# Patient Record
Sex: FEMALE | Race: WHITE | NOT HISPANIC OR LATINO | Employment: UNEMPLOYED | URBAN - METROPOLITAN AREA
[De-identification: names, ages, dates, MRNs, and addresses within clinical notes are randomized per-mention and may not be internally consistent; named-entity substitution may affect disease eponyms.]

---

## 2021-04-27 ENCOUNTER — LAB (OUTPATIENT)
Dept: LAB | Facility: HOSPITAL | Age: 41
End: 2021-04-27

## 2021-04-27 ENCOUNTER — OFFICE VISIT (OUTPATIENT)
Dept: NEUROLOGY | Facility: CLINIC | Age: 41
End: 2021-04-27

## 2021-04-27 VITALS
TEMPERATURE: 97.5 F | HEART RATE: 97 BPM | SYSTOLIC BLOOD PRESSURE: 116 MMHG | HEIGHT: 65 IN | BODY MASS INDEX: 34.16 KG/M2 | DIASTOLIC BLOOD PRESSURE: 72 MMHG | WEIGHT: 205 LBS | OXYGEN SATURATION: 99 %

## 2021-04-27 DIAGNOSIS — M62.81 MUSCLE WEAKNESS OF LOWER EXTREMITY: ICD-10-CM

## 2021-04-27 DIAGNOSIS — M62.81 MUSCLE WEAKNESS OF LOWER EXTREMITY: Primary | ICD-10-CM

## 2021-04-27 LAB
CK SERPL-CCNC: 43 U/L (ref 20–180)
LDH SERPL-CCNC: 181 U/L (ref 135–214)

## 2021-04-27 PROCEDURE — 99204 OFFICE O/P NEW MOD 45 MIN: CPT | Performed by: PSYCHIATRY & NEUROLOGY

## 2021-04-27 PROCEDURE — 82550 ASSAY OF CK (CPK): CPT

## 2021-04-27 PROCEDURE — 82085 ASSAY OF ALDOLASE: CPT

## 2021-04-27 PROCEDURE — 83615 LACTATE (LD) (LDH) ENZYME: CPT

## 2021-04-27 PROCEDURE — 36415 COLL VENOUS BLD VENIPUNCTURE: CPT

## 2021-04-27 RX ORDER — TIZANIDINE 4 MG/1
TABLET ORAL
COMMUNITY
Start: 2021-03-10

## 2021-04-27 RX ORDER — FERROUS SULFATE 325(65) MG
TABLET ORAL
COMMUNITY
Start: 2021-03-10

## 2021-04-27 RX ORDER — ASPIRIN 81 MG/1
TABLET, FILM COATED ORAL
COMMUNITY
Start: 2021-02-17

## 2021-04-27 RX ORDER — ERTUGLIFLOZIN 15 MG/1
TABLET, FILM COATED ORAL
COMMUNITY
Start: 2021-03-11 | End: 2021-06-22

## 2021-04-27 RX ORDER — PANTOPRAZOLE SODIUM 40 MG/1
TABLET, DELAYED RELEASE ORAL
COMMUNITY
Start: 2021-04-16

## 2021-04-27 RX ORDER — ATORVASTATIN CALCIUM 20 MG/1
TABLET, FILM COATED ORAL
COMMUNITY
Start: 2021-02-09

## 2021-04-27 RX ORDER — BLOOD SUGAR DIAGNOSTIC
STRIP MISCELLANEOUS
COMMUNITY
Start: 2021-04-16

## 2021-04-27 RX ORDER — SYRINGE AND NEEDLE,INSULIN,1ML 31 GX5/16"
SYRINGE, EMPTY DISPOSABLE MISCELLANEOUS
COMMUNITY
Start: 2021-02-09

## 2021-04-27 RX ORDER — LORATADINE 10 MG/1
TABLET ORAL
COMMUNITY
Start: 2021-03-09

## 2021-04-27 RX ORDER — LISINOPRIL 10 MG/1
TABLET ORAL
COMMUNITY
Start: 2021-02-22

## 2021-04-27 RX ORDER — LANCETS 30 GAUGE
EACH MISCELLANEOUS
COMMUNITY
Start: 2021-04-16

## 2021-04-27 NOTE — PROGRESS NOTES
Subjective:    CC: Afia Soria is seen today in consultation at the request of Reva López for Muscle weakness       HPI:  Patient is a 41-year-old female with past medical history of diabetes, hyperlipidemia, hypertension referred to the clinic for evaluation of lower extremity weakness.  She reports that symptoms started back in November 2019 quite abruptly and by December 2019, she had to start using walker.  She reports that since then, she has not noticed much improvement in her muscle strength except for slight improvement in ankle flexion and dorsiflexion with continued physical therapy.  She reports that is difficult for her to climb up stairs and also difficult for her to get up from chair.  She denies any weakness in her arms.  She was diagnosed with diabetes back in 2012.  Last A1c was 8.  She denies any double vision, no problems with chewing, swallowing or breathing.  She denies tingling, numbness, burning in her feet or hands.  She has had MRI of lumbosacral spine as well as thoracic spine which showed only minimal to mild spondylitic changes.  She is unsure if EMG/nerve conduction study has been completed or not.  She denies any pain in proximal muscles in lower extremities, there is no evidence of muscle atrophy.    The following portions of the patient's history were reviewed today and updated as of 04/27/2021  : allergies, social history and problem list.  This document will be scanned to patient's chart.      Current Outpatient Medications:   •  Admelog 100 UNIT/ML injection, , Disp: , Rfl:   •  Adult Aspirin Regimen 81 MG EC tablet, , Disp: , Rfl:   •  atorvastatin (LIPITOR) 20 MG tablet, , Disp: , Rfl:   •  FeroSul 325 (65 Fe) MG tablet, , Disp: , Rfl:   •  Lancets (OneTouch Delica Plus [id number]) misc, , Disp: , Rfl:   •  lisinopril (PRINIVIL,ZESTRIL) 10 MG tablet, , Disp: , Rfl:   •  loratadine (CLARITIN) 10 MG tablet, , Disp: , Rfl:   •  metFORMIN (GLUCOPHAGE) 1000 MG tablet, ,  "Disp: , Rfl:   •  metoprolol tartrate (LOPRESSOR) 25 MG tablet, , Disp: , Rfl:   •  NovoLOG 100 UNIT/ML injection, , Disp: , Rfl:   •  OneTouch Ultra test strip, , Disp: , Rfl:   •  pantoprazole (PROTONIX) 40 MG EC tablet, , Disp: , Rfl:   •  Steglatro 15 MG tablet, , Disp: , Rfl:   •  tiZANidine (ZANAFLEX) 4 MG tablet, , Disp: , Rfl:   •  TRUEplus Insulin Syringe 31G X 5/16\" 1 ML misc, , Disp: , Rfl:    Past Medical History:   Diagnosis Date   • Anemia    • Diabetes mellitus (CMS/HCC)    • Disc disorder    • GERD (gastroesophageal reflux disease)    • Hyperlipidemia    • Hypertension       Past Surgical History:   Procedure Laterality Date   • GALLBLADDER SURGERY        Family History   Problem Relation Age of Onset   • Stroke Mother    • Dementia Father    • Dementia Paternal Grandmother       Review of Systems   HENT: Negative.    Eyes: Negative.    Respiratory: Negative.    Cardiovascular: Negative.    Gastrointestinal: Negative.    Endocrine: Negative.    Genitourinary: Negative.    Musculoskeletal: Positive for gait problem.   Skin: Negative.    Allergic/Immunologic: Negative.    Neurological: Positive for weakness and numbness.   Hematological: Negative.    Psychiatric/Behavioral: Negative.        All other systems reviewed and are negative     Objective:    /72   Pulse 97   Temp 97.5 °F (36.4 °C)   Ht 165.1 cm (65\")   Wt 93 kg (205 lb)   SpO2 99%   BMI 34.11 kg/m²     Neurology Exam:    General apperance: NAD.     Mental status: Alert, awake and oriented to time place and person.    Recent and Remote memory: Can recall 3/3 objects at 5 minutes. Can recall historical events.     Attention span and Concentration: Serial 7s: Normal.     Fund of knowledge:  Normal.     Language and Speech: No aphasia or dysarthria.    Naming , Repitition and Comprehension:  Can name objects, repeat a sentence and follow commands. Speech is clear and fluent with good repetition, comprehension, and naming.    Cranial " Nerves:   CN II: Visual fields are full. Intact. Fundi - Normal, No papillederma, Pupils - CR  CN III, IV and VI: Extraocular movements are intact. Normal saccades.   CN V: Facial sensation is intact.   CN VII: Muscles of facial expression reveal no asymmetry. Intact.   CN VIII: Hearing is intact. Whispered voice intact.   CN IX and X: Palate elevates symmetrically. Intact  CN XI: Shoulder shrug is intact.   CN XII: Tongue is midline without evidence of atrophy or fasciculation.     Motor:  Right UE muscle strength 5/5. Normal tone.     Left UE muscle strength 5/5. Normal tone.      Right LE muscle strength 4/5. Normal tone.  Giveaway weakness noted.    Left LE muscle strength 4/5. Normal tone.  Giveaway weakness noted.    Sensory: Normal light touch, vibration and pinprick sensation bilaterally.    DTRs: 2+ bilaterally in upper and lower extremities.    Babinski: Negative bilaterally.    Co-ordination: Normal finger-to-nose, heel to shin B/L.    Rhomberg: Negative.    Gait: Normal.    Cardiovascular: Regular rate and rhythm without murmur, gallop or rub.    Ophthalmoscopic exam: Normal fundi, no papilledema.    Assessment and Plan:  1. Muscle weakness of lower extremity  Patient with abrupt onset of bilateral proximal muscle weakness in lower extremities back in November 2019.  Since December 2019, she is using a walker to walk.  MRI of her lumbar spine and thoracic spine performed in [place], [place] did not reveal any abnormality that could explain her symptoms.  On my examination, she was noted to have significant giveaway weakness in both lower extremity proximal muscles.  I did not notice any evidence of muscle atrophy and she denies any pain in proximal muscles that could explain possibility of diabetic amyotrophy causing the symptoms.  She has been on statin for a while so I will be checking muscle enzymes and will be scheduling her for EMG/nerve conduction study for further evaluation.  If normal then  with aggressive physical therapy, this should get better.  I will plan to see her back in 8 weeks for follow-up.  - CK; Future  - Aldolase; Future  - Lactate Dehydrogenase; Future  - EMG & Nerve Conduction Test; Future       No follow-ups on file.     Frank Vargas MD

## 2021-04-28 LAB — ALDOLASE SERPL-CCNC: 3.7 U/L (ref 3.3–10.3)

## 2021-05-11 ENCOUNTER — TELEPHONE (OUTPATIENT)
Dept: NEUROLOGY | Facility: CLINIC | Age: 41
End: 2021-05-11

## 2021-05-11 NOTE — TELEPHONE ENCOUNTER
Provider: DR MANJARREZ    Caller: YOVANNY URIBE    Relationship to Patient: SELF    Reason for Call: ANY CALLING IN STATING THAT [facility] FAXED OVER A FORM FOR DR MANJARREZ TO SIGN.   SHE STATES THAT THEY NEVER RECEIVED THE FAX BACK.   PLEASE FAX THE FORM TO [facility].  ANY QUESTIONS PLEASE CALL PATIENT.

## 2021-05-13 NOTE — TELEPHONE ENCOUNTER
PT STATES THIS WAS FAXED AGAIN. PT WOULD LIKE CONFIRMATION. PT STATES THAT THEY HAVEN'T RECEIVED THE PAPER WORK BACK. PT NEEDS THIS BY TOMORROW TO BE ABLE TO BE TRANSPORTED TO HER APPT WITH DR. ALEX     CALL BACK -[phone]

## 2021-05-18 ENCOUNTER — PROCEDURE VISIT (OUTPATIENT)
Dept: NEUROLOGY | Facility: CLINIC | Age: 41
End: 2021-05-18

## 2021-05-18 DIAGNOSIS — M62.81 MUSCLE WEAKNESS OF LOWER EXTREMITY: ICD-10-CM

## 2021-05-18 PROCEDURE — 95886 MUSC TEST DONE W/N TEST COMP: CPT | Performed by: PSYCHIATRY & NEUROLOGY

## 2021-05-18 PROCEDURE — 95911 NRV CNDJ TEST 9-10 STUDIES: CPT | Performed by: PSYCHIATRY & NEUROLOGY

## 2021-05-18 NOTE — PROGRESS NOTES
[facility], [place], operated by [facility]   Electrodiagnostic Laboratory    Nerve Conduction & EMG Report        Patient:  Afia Soria   Patient ID: [id number]   YOB: 1980  Sex:  female      Exam Physician:  Geraldo Shah MD  Refer Physician:  Frank Vargas MD    Electromyogram and Nerve Conduction Velocity Procedure Note    Hx: 41 y.o. right handed female with complaint of weakness involving the both lower extremities. Symptoms have been present for several months and were provoked by no clear event. Significant past medical history includes diabetes mellitus.  Family history no family history of nerve or muscle disease.    Exam: Motor power is 4/5 B LE. There is no atrophy. There are no fasciculations. Deep tendon reflexes are present and symmetrical at 2+. Sensory exam is normal.      Edx studies of the B LE were performed to evaluate for peripheral neuropathy.     NCS Examination   For sensory nerve conduction studies, the amplitude is measured peak-to-peak, the latency reported is the distal peak latency, and the conduction velocity, if measured, is determined from onset latencies and is over the forearm.   For motor nerve conduction studies, the amplitude is measured baseline-to-peak, the latency reported is the distal onset latency, the conduction velocity is calculated over the forearm, and the F wave latency is the minimum latency.   Unless otherwise noted, the hand temperature was monitored continuously and remained between 32°C and 36°C during the performance of the NCSs.        Sensory NCS      Nerve / Sites Rec. Site Onset Lat Peak Lat NP Amp PP Amp Segments Distance Velocity     ms ms µV µV  cm m/s   L Sural - Ankle (Calf)      Calf Ankle 1.88 2.60 4.0 16.0 Calf - Ankle 14 75   R Sural - Ankle (Calf)      Calf Ankle 2.14 2.86 7.6 4.4 Calf - Ankle 14 66   L Superficial peroneal - Ankle      Lat leg Ankle 3.07 4.06 11.1  Lat leg - Ankle 14 46   R Superficial peroneal - Ankle      Lat leg Ankle  2.40 3.28 7.9 17.3 Lat leg - Ankle 14 58               Motor NCS      Nerve / Sites Muscle Latency Amplitude Amp % Duration Segments Distance Lat Diff Velocity     ms mV % ms  cm ms m/s   L Peroneal - EDB      Ankle EDB 3.75 6.6 100 6.09 Ankle - EDB 8        Fib head EDB 11.25 4.8 72.5 7.45 Fib head - Ankle 33 7.50 44      Pop fossa EDB 12.81 5.9 90.4 6.51 Pop fossa - Fib head 9 1.56 58   R Peroneal - EDB      Ankle EDB 3.80 5.0 100 5.47 Ankle - EDB 8        Fib head EDB 9.79 4.9 97.8 8.75 Fib head - Ankle 30 5.99 50      Pop fossa EDB 12.03 0.2 4.08 8.80 Pop fossa - Fib head 10 2.24 45   L Tibial - AH      Ankle AH 4.22 9.6 100 6.56 Ankle - AH 8        Pop fossa AH 14.38 3.2 32.9 7.29 Pop fossa - Ankle 40 10.16 39   R Tibial - AH      Ankle AH 5.57 2.0 100 4.53 Ankle - AH 8        Pop fossa AH 10.99 0.5 26.3 37.50 Pop fossa - Ankle 40 5.42 74               F  Wave      Nerve F Lat M Lat F-M Lat    ms ms ms   L Peroneal - EDB 54.6 3.2 51.4   L Tibial - AH 55.1 4.4 50.6   R Tibial - AH 54.8 4.9 49.9   R Peroneal - EDB 51.6 3.1 48.5               H Reflex      Nerve H Lat    ms   L Tibial - Soleus 30.8   R Tibial - Soleus 31.7               EMG Examination   The study was performed with a concentric needle electrode. Fibrillation and fasciculation activity is graded from none (0) to continuous (4+). The configuration and recruitment pattern of motor unit action potentials under voluntary control, if not normal, are described below    EMG Summary Table     Spontaneous MUAP Recruitment   Muscle Nerve Roots IA Fib PSW Fasc H.F. Amp Dur. PPP Pattern   L. Lumbar paraspinals Spinal L1-L5 N None None None None N N N N   L. Gastrocnemius (Medial head) Tibial S1-S2 N None None None None N N N N   R. Gastrocnemius (Medial head) Tibial S1-S2 N None None None None N N N N   R. Lumbar paraspinals Spinal L1-L5 N None None None None N N N N   L. Peroneus longus Perineal L5-S1 N None None None None N N N N   R. Peroneus longus  Perineal L5-S1 N None None None None N N N N   L. Tibialis anterior Deep peroneal (Fibular) L4-L5 N None None None None N N N N   R. Tibialis anterior Deep peroneal (Fibular) L4-L5 N None None None None N N N N   L. Tibialis posterior Tibial L4-L5 N None None None None N N N N   R. Tibialis posterior Tibial L4-L5 N None None None None N N N N   L. Vastus lateralis Femoral L2-L4 N None None None None N N N N   R. Vastus lateralis Femoral L2-L4 N None None None None N N N N       Summary    The motor conduction test was performed on 4 nerve(s). The results were normal in 3 nerve(s): L Peroneal - EDB, R Peroneal - EDB, L Tibial - AH. Results outside the specified normal range were found in 1 nerve(s), as follows:  • In the R Tibial - AH study  o the peak amplitude result was reduced for Ankle stimulation    The sensory conduction test was performed on 4 nerve(s). The results were normal in 3 nerve(s): R Sural - Ankle (Calf), L Superficial peroneal - Ankle, R Superficial peroneal - Ankle. Results outside the specified normal range were found in 1 nerve(s), as follows:  • In the L Sural - Ankle (Calf) study  o the peak amplitude result was reduced for Calf stimulation    The F wave study was normal in all 4 of the tested nerves: L Peroneal - EDB, L Tibial - AH, R Tibial - AH, R Peroneal - EDB.    The H reflex study was normal in all 2 of the tested nerves: L Tibial - Soleus, R Tibial - Soleus.    The needle EMG study was normal in all 12 tested muscles: L. Lumbar paraspinals, L. Gastrocnemius (Medial head), R. Gastrocnemius (Medial head), R. Lumbar paraspinals, L. Peroneus longus, R. Peroneus longus, L. Tibialis anterior, R. Tibialis anterior, L. Tibialis posterior, R. Tibialis posterior, L. Vastus lateralis, R. Vastus lateralis.         Conclusion: Normal NCV and EMG of the right lower extremity and left lower extremity          Instrument used:  Teca Synergy        Performed by:          Geraldo Shah MD

## 2021-06-22 ENCOUNTER — OFFICE VISIT (OUTPATIENT)
Dept: NEUROLOGY | Facility: CLINIC | Age: 41
End: 2021-06-22

## 2021-06-22 VITALS
OXYGEN SATURATION: 97 % | DIASTOLIC BLOOD PRESSURE: 90 MMHG | SYSTOLIC BLOOD PRESSURE: 136 MMHG | WEIGHT: 203 LBS | BODY MASS INDEX: 33.82 KG/M2 | HEART RATE: 96 BPM | HEIGHT: 65 IN

## 2021-06-22 DIAGNOSIS — M62.81 MUSCLE WEAKNESS OF LOWER EXTREMITY: Primary | ICD-10-CM

## 2021-06-22 PROCEDURE — 99214 OFFICE O/P EST MOD 30 MIN: CPT | Performed by: PSYCHIATRY & NEUROLOGY

## 2021-06-22 RX ORDER — PEN NEEDLE, DIABETIC 31 GX5/16"
NEEDLE, DISPOSABLE MISCELLANEOUS
COMMUNITY
Start: 2021-05-13

## 2021-06-22 NOTE — PROGRESS NOTES
Subjective:    CC: Afia Soria is in clinic today for follow up for      HPI:  Initial visit: 4/27/2021: Patient is a 41-year-old female with past medical history of diabetes, hyperlipidemia, hypertension referred to the clinic for evaluation of lower extremity weakness.  She reports that symptoms started back in November 2019 quite abruptly and by December 2019, she had to start using walker.  She reports that since then, she has not noticed much improvement in her muscle strength except for slight improvement in ankle flexion and dorsiflexion with continued physical therapy.  She reports that is difficult for her to climb up stairs and also difficult for her to get up from chair.  She denies any weakness in her arms.  She was diagnosed with diabetes back in 2012.  Last A1c was 8.  She denies any double vision, no problems with chewing, swallowing or breathing.  She denies tingling, numbness, burning in her feet or hands.  She has had MRI of lumbosacral spine as well as thoracic spine which showed only minimal to mild spondylitic changes.  She is unsure if EMG/nerve conduction study has been completed or not.  She denies any pain in proximal muscles in lower extremities, there is no evidence of muscle atrophy.    Follow-up: 6/22/2021: She is in clinic for regular follow-up.  Since her initial visit in April 2021, she has now completed EMG/nerve conduction study which was normal.  She has had CK, aldolase and LDH levels done which were normal as well.  She reports that she continues to do physical therapy twice in a week and has noticed only minor improvement in strength.  She has to use walker for the most part.  She falls whenever she does not have a walker.    The following portions of the patient's history were reviewed and updated as of 06/22/2021: allergies, social history and problem list.       Current Outpatient Medications:   •  Admelog 100 UNIT/ML injection, , Disp: , Rfl:   •  Adult Aspirin Regimen 81  "MG EC tablet, , Disp: , Rfl:   •  atorvastatin (LIPITOR) 20 MG tablet, , Disp: , Rfl:   •  Canagliflozin (INVOKANA) 100 MG tablet tablet, Take 200 mg by mouth., Disp: , Rfl:   •  FeroSul 325 (65 Fe) MG tablet, , Disp: , Rfl:   •  Insulin Pen Needle (B-D ULTRAFINE III SHORT PEN) 31G X 8 MM misc, , Disp: , Rfl:   •  Lancets (OneTouch Delica Plus [id number]) misc, , Disp: , Rfl:   •  lisinopril (PRINIVIL,ZESTRIL) 10 MG tablet, , Disp: , Rfl:   •  loratadine (CLARITIN) 10 MG tablet, , Disp: , Rfl:   •  metFORMIN (GLUCOPHAGE) 1000 MG tablet, , Disp: , Rfl:   •  metoprolol tartrate (LOPRESSOR) 25 MG tablet, , Disp: , Rfl:   •  NovoLOG 100 UNIT/ML injection, , Disp: , Rfl:   •  OneTouch Ultra test strip, , Disp: , Rfl:   •  pantoprazole (PROTONIX) 40 MG EC tablet, , Disp: , Rfl:   •  tiZANidine (ZANAFLEX) 4 MG tablet, , Disp: , Rfl:   •  TRUEplus Insulin Syringe 31G X 5/16\" 1 ML misc, , Disp: , Rfl:    Past Medical History:   Diagnosis Date   • Anemia    • Diabetes mellitus (CMS/HCC)    • Disc disorder    • GERD (gastroesophageal reflux disease)    • Hyperlipidemia    • Hypertension       Past Surgical History:   Procedure Laterality Date   • GALLBLADDER SURGERY        Family History   Problem Relation Age of Onset   • Stroke Mother    • Dementia Father    • Dementia Paternal Grandmother         Review of Systems   HENT: Negative.    Eyes: Negative.    Respiratory: Negative.    Cardiovascular: Negative.    Gastrointestinal: Negative.    Endocrine: Negative.    Genitourinary: Negative.    Musculoskeletal: Positive for gait problem.   Skin: Negative.    Allergic/Immunologic: Negative.    Neurological: Positive for weakness.   Hematological: Negative.    Psychiatric/Behavioral: Negative.      Objective:    /90   Pulse 96   Ht 165.1 cm (65\")   Wt 92.1 kg (203 lb)   SpO2 97%   BMI 33.78 kg/m²     Neurology Exam:  General apperance: NAD.     Mental status: Alert, awake and oriented to time place and person.    Recent " and Remote memory: Can recall 3/3 objects at 5 minutes. Can recall historical events.     Attention span and Concentration: Serial 7s: Normal.     Fund of knowledge:  Normal.     Language and Speech: No aphasia or dysarthria.    Naming , Repitition and Comprehension:  Can name objects, repeat a sentence and follow commands. Speech is clear and fluent with good repetition, comprehension, and naming.    CN II to XII: Intact.    Opthalmoscopic Exam: No papilledema.    Motor:  Right UE muscle strength 5/5. Normal tone.     Left UE muscle strength 5/5. Normal tone.      Right LE muscle strength5/5. Normal tone.     Left LE muscle strength 5/5. Normal tone.      Sensory: Normal light touch, vibration and pinprick sensation bilaterally.    DTRs: 2+ bilaterally.    Babinski: Negative bilaterally.    Co-ordination: Normal finger-to-nose, heel to shin B/L.    Rhomberg: Negative.    Gait: Normal.    Cardiovascular: Regular rate and rhythm without murmur, gallop or rub.    Assessment and Plan:  1. 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  She has had EMG/nerve conduction study which was normal.  CK, aldolase and LDH is normal.  She has significant giveaway weakness in both lower extremity.  I have advised her to continue with physical therapy and that should help.  No medication is recommended.  I will plan to see her back in clinic in 6 months for follow-up.       I spent 30 minutes face to face with the patient and spent more than 50% of this time  in management, instructions and education regarding above mentioned diagnosis and also on counseling and discussing about taking medication regularly, possible side effects with medication use, importance of good sleep hygiene, good hydration and regular exercise.    Return in about 3 months (around 9/22/2021).

## 2022-04-19 ENCOUNTER — OFFICE VISIT (OUTPATIENT)
Dept: NEUROLOGY | Facility: CLINIC | Age: 42
End: 2022-04-19

## 2022-04-19 VITALS
HEIGHT: 65 IN | OXYGEN SATURATION: 99 % | RESPIRATION RATE: 12 BRPM | WEIGHT: 203 LBS | HEART RATE: 104 BPM | SYSTOLIC BLOOD PRESSURE: 132 MMHG | DIASTOLIC BLOOD PRESSURE: 86 MMHG | BODY MASS INDEX: 33.82 KG/M2

## 2022-04-19 DIAGNOSIS — F44.9 CONVERSION DISORDER: ICD-10-CM

## 2022-04-19 DIAGNOSIS — M62.81 MUSCLE WEAKNESS OF LOWER EXTREMITY: Primary | ICD-10-CM

## 2022-04-19 PROCEDURE — 99214 OFFICE O/P EST MOD 30 MIN: CPT | Performed by: PSYCHIATRY & NEUROLOGY

## 2022-04-19 NOTE — PROGRESS NOTES
Subjective:    CC: Afia Soria is in clinic today for follow up for bilateral lower extremity weakness.    HPI:  Initial visit: 4/27/2021: Patient is a 41-year-old female with past medical history of diabetes, hyperlipidemia, hypertension referred to the clinic for evaluation of lower extremity weakness.  She reports that symptoms started back in November 2019 quite abruptly and by December 2019, she had to start using walker.  She reports that since then, she has not noticed much improvement in her muscle strength except for slight improvement in ankle flexion and dorsiflexion with continued physical therapy.  She reports that is difficult for her to climb up stairs and also difficult for her to get up from chair.  She denies any weakness in her arms.  She was diagnosed with diabetes back in 2012.  Last A1c was 8.  She denies any double vision, no problems with chewing, swallowing or breathing.  She denies tingling, numbness, burning in her feet or hands.  She has had MRI of lumbosacral spine as well as thoracic spine which showed only minimal to mild spondylitic changes.  She is unsure if EMG/nerve conduction study has been completed or not.  She denies any pain in proximal muscles in lower extremities, there is no evidence of muscle atrophy.    Follow-up: 6/22/2021: She is in clinic for regular follow-up.  Since her initial visit in April 2021, she has now completed EMG/nerve conduction study which was normal.  She has had CK, aldolase and LDH levels done which were normal as well.  She reports that she continues to do physical therapy twice in a week and has noticed only minor improvement in strength.  She has to use walker for the most part.  She falls whenever she does not have a walker.    Follow-up: 4/19/2022: She is in clinic for regular follow-up.  Since her last visit in June 2021, she reports that there has been mild improvement in bilateral lower extremity weakness with continued physical therapy  "however she has days when she feels weaker.  EMG/nerve conduction study performed in May 2021 was normal.  Muscle enzymes including CK, aldolase and LDH were normal as well.    The following portions of the patient's history were reviewed and updated as of 04/19/2022: allergies, social history and problem list.       Current Outpatient Medications:   •  Admelog 100 UNIT/ML injection, , Disp: , Rfl:   •  Adult Aspirin Regimen 81 MG EC tablet, , Disp: , Rfl:   •  atorvastatin (LIPITOR) 20 MG tablet, , Disp: , Rfl:   •  Canagliflozin (INVOKANA) 100 MG tablet tablet, Take 200 mg by mouth., Disp: , Rfl:   •  Dulaglutide 0.75 MG/0.5ML solution pen-injector, Inject  under the skin into the appropriate area as directed., Disp: , Rfl:   •  FeroSul 325 (65 Fe) MG tablet, , Disp: , Rfl:   •  Insulin Pen Needle (B-D ULTRAFINE III SHORT PEN) 31G X 8 MM misc, , Disp: , Rfl:   •  Lancets (OneTouch Delica Plus [id number]) misc, , Disp: , Rfl:   •  lisinopril (PRINIVIL,ZESTRIL) 10 MG tablet, , Disp: , Rfl:   •  loratadine (CLARITIN) 10 MG tablet, , Disp: , Rfl:   •  metFORMIN (GLUCOPHAGE) 1000 MG tablet, , Disp: , Rfl:   •  metoprolol tartrate (LOPRESSOR) 25 MG tablet, , Disp: , Rfl:   •  NovoLOG 100 UNIT/ML injection, , Disp: , Rfl:   •  OneTouch Ultra test strip, , Disp: , Rfl:   •  pantoprazole (PROTONIX) 40 MG EC tablet, , Disp: , Rfl:   •  tiZANidine (ZANAFLEX) 4 MG tablet, , Disp: , Rfl:   •  TRUEplus Insulin Syringe 31G X 5/16\" 1 ML misc, , Disp: , Rfl:    Past Medical History:   Diagnosis Date   • Anemia    • Diabetes mellitus (HCC)    • Disc disorder    • GERD (gastroesophageal reflux disease)    • Hyperlipidemia    • Hypertension       Past Surgical History:   Procedure Laterality Date   • GALLBLADDER SURGERY        Family History   Problem Relation Age of Onset   • Stroke Mother    • Dementia Father    • Dementia Paternal Grandmother         Review of Systems  Objective:    /86   Pulse 104   Resp 12   Ht 165.1 cm " "(65\")   Wt 92.1 kg (203 lb)   SpO2 99%   BMI 33.78 kg/m²     Neurology Exam:  General apperance: NAD.     Mental status: Alert, awake and oriented to time place and person.    Recent and Remote memory: Can recall 3/3 objects at 5 minutes. Can recall historical events.     Attention span and Concentration: Serial 7s: Normal.     Fund of knowledge:  Normal.     Language and Speech: No aphasia or dysarthria.    Naming , Repitition and Comprehension:  Can name objects, repeat a sentence and follow commands. Speech is clear and fluent with good repetition, comprehension, and naming.    CN II to XII: Intact.    Opthalmoscopic Exam: No papilledema.    Motor:  Right UE muscle strength 5/5. Normal tone.     Left UE muscle strength 5/5. Normal tone.      Right LE muscle strength5/5. Normal tone.     Left LE muscle strength 5/5. Normal tone.      Sensory: Normal light touch, vibration and pinprick sensation bilaterally.    DTRs: 2+ bilaterally.    Babinski: Negative bilaterally.    Co-ordination: Normal finger-to-nose, heel to shin B/L.    Rhomberg: Negative.    Gait: Normal.    Cardiovascular: Regular rate and rhythm without murmur, gallop or rub.    Assessment and Plan:  1. Muscle weakness of lower extremity  2. 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  She continues to have significant amount of giveaway weakness on examination.  There is no evidence of muscle atrophy.  So for EMG/nerve conduction study, CK, aldolase and LDH levels were normal.  No medications.  Continue physical therapy as she has noted some improvement in bilateral lower extremity weakness.  I am hoping that with time and continued physical therapy, symptoms will improve.  She reports that her mood is good and denies any depression or underlying anxiety.  I will plan to see her back in clinic in 6 months for follow-up.       I spent 30 minutes in patient care: Reviewing records prior to the visit, entering orders and documentation " and spent more than [place] 50% of this time face-to-face in management, instructions and education regarding above mentioned diagnosis and also on counseling and discussing about taking medication regularly, possible side effects with medication use, importance of good sleep hygiene, good hydration and regular exercise.    Return in about 6 months (around 10/19/2022).

## 2022-10-19 ENCOUNTER — OFFICE VISIT (OUTPATIENT)
Dept: NEUROLOGY | Facility: CLINIC | Age: 42
End: 2022-10-19

## 2022-10-19 VITALS
WEIGHT: 203.04 LBS | DIASTOLIC BLOOD PRESSURE: 80 MMHG | HEART RATE: 100 BPM | SYSTOLIC BLOOD PRESSURE: 112 MMHG | OXYGEN SATURATION: 97 % | HEIGHT: 65 IN | BODY MASS INDEX: 33.83 KG/M2

## 2022-10-19 DIAGNOSIS — F44.9 CONVERSION DISORDER: ICD-10-CM

## 2022-10-19 DIAGNOSIS — M62.81 MUSCLE WEAKNESS OF LOWER EXTREMITY: Primary | ICD-10-CM

## 2022-10-19 PROCEDURE — 99214 OFFICE O/P EST MOD 30 MIN: CPT | Performed by: PSYCHIATRY & NEUROLOGY

## 2022-10-19 NOTE — PROGRESS NOTES
Subjective:    CC: Afia Soria is in clinic today for follow up for bilateral lower extremity weakness.    HPI:  Initial visit: 4/27/2021: Patient is a 41-year-old female with past medical history of diabetes, hyperlipidemia, hypertension referred to the clinic for evaluation of lower extremity weakness.  She reports that symptoms started back in November 2019 quite abruptly and by December 2019, she had to start using walker.  She reports that since then, she has not noticed much improvement in her muscle strength except for slight improvement in ankle flexion and dorsiflexion with continued physical therapy.  She reports that is difficult for her to climb up stairs and also difficult for her to get up from chair.  She denies any weakness in her arms.  She was diagnosed with diabetes back in 2012.  Last A1c was 8.  She denies any double vision, no problems with chewing, swallowing or breathing.  She denies tingling, numbness, burning in her feet or hands.  She has had MRI of lumbosacral spine as well as thoracic spine which showed only minimal to mild spondylitic changes.  She is unsure if EMG/nerve conduction study has been completed or not.  She denies any pain in proximal muscles in lower extremities, there is no evidence of muscle atrophy.    Follow-up: 6/22/2021: She is in clinic for regular follow-up.  Since her initial visit in April 2021, she has now completed EMG/nerve conduction study which was normal.  She has had CK, aldolase and LDH levels done which were normal as well.  She reports that she continues to do physical therapy twice in a week and has noticed only minor improvement in strength.  She has to use walker for the most part.  She falls whenever she does not have a walker.    Follow-up: 4/19/2022: She is in clinic for regular follow-up.  Since her last visit in June 2021, she reports that there has been mild improvement in bilateral lower extremity weakness with continued physical therapy  "however she has days when she feels weaker.  EMG/nerve conduction study performed in May 2021 was normal.  Muscle enzymes including CK, aldolase and LDH were normal as well.    Follow-up: 10/19/2022: She is in clinic for regular follow-up.  Since her last visit in April 2022, she reports that she continues to have bilateral lower extremity weakness.  She has good days and bad days.  She has stopped doing physical therapy but reports that she tries to do exercises at home as much as possible.    The following portions of the patient's history were reviewed and updated as of 10/19/2022: allergies, social history and problem list.       Current Outpatient Medications:   •  Admelog 100 UNIT/ML injection, , Disp: , Rfl:   •  Adult Aspirin Regimen 81 MG EC tablet, , Disp: , Rfl:   •  atorvastatin (LIPITOR) 20 MG tablet, , Disp: , Rfl:   •  Dulaglutide 0.75 MG/0.5ML solution pen-injector, Inject  under the skin into the appropriate area as directed., Disp: , Rfl:   •  FeroSul 325 (65 Fe) MG tablet, , Disp: , Rfl:   •  Insulin Pen Needle (B-D ULTRAFINE III SHORT PEN) 31G X 8 MM misc, , Disp: , Rfl:   •  Lancets (OneTouch Delica Plus [id number]) misc, , Disp: , Rfl:   •  lisinopril (PRINIVIL,ZESTRIL) 10 MG tablet, , Disp: , Rfl:   •  loratadine (CLARITIN) 10 MG tablet, , Disp: , Rfl:   •  metFORMIN (GLUCOPHAGE) 1000 MG tablet, , Disp: , Rfl:   •  metoprolol tartrate (LOPRESSOR) 25 MG tablet, , Disp: , Rfl:   •  NovoLOG 100 UNIT/ML injection, , Disp: , Rfl:   •  OneTouch Ultra test strip, , Disp: , Rfl:   •  pantoprazole (PROTONIX) 40 MG EC tablet, , Disp: , Rfl:   •  tiZANidine (ZANAFLEX) 4 MG tablet, , Disp: , Rfl:   •  TRUEplus Insulin Syringe 31G X 5/16\" 1 ML misc, , Disp: , Rfl:   •  Canagliflozin (INVOKANA) 100 MG tablet tablet, Take 200 mg by mouth., Disp: , Rfl:    Past Medical History:   Diagnosis Date   • Anemia    • Diabetes mellitus (HCC)    • Difficulty walking    • Disc disorder    • GERD (gastroesophageal reflux " "disease)    • Hyperlipidemia    • Hypertension       Past Surgical History:   Procedure Laterality Date   • GALLBLADDER SURGERY        Family History   Problem Relation Age of Onset   • Stroke Mother    • Dementia Father    • Dementia Paternal Grandmother    • Stroke Maternal Grandmother         Review of Systems  Objective:    /80   Pulse 100   Ht 165.1 cm (65\")   Wt 92.1 kg (203 lb 0.7 oz)   SpO2 97%   BMI 33.79 kg/m²     Neurology Exam:  General apperance: NAD.     Mental status: Alert, awake and oriented to time place and person.    Recent and Remote memory: Can recall 3/3 objects at 5 minutes. Can recall historical events.     Attention span and Concentration: Serial 7s: Normal.     Fund of knowledge:  Normal.     Language and Speech: No aphasia or dysarthria.    Naming , Repitition and Comprehension:  Can name objects, repeat a sentence and follow commands. Speech is clear and fluent with good repetition, comprehension, and naming.    CN II to XII: Intact.    Opthalmoscopic Exam: No papilledema.    Motor:  Right UE muscle strength 5/5. Normal tone.     Left UE muscle strength 5/5. Normal tone.      Right LE muscle strength 4/5. Normal tone.  Giveaway weakness noted.    Left LE muscle strength 4/5. Normal tone.  Giveaway weakness noted.    Sensory: Normal light touch, vibration and pinprick sensation bilaterally.    DTRs: 2+ bilaterally.    Babinski: Negative bilaterally.    Co-ordination: Normal finger-to-nose, heel to shin B/L.    Rhomberg: Negative.    Gait: Walks slowly with the help of a walker.  While walking, her knee gives away intermittently.    Cardiovascular: Regular rate and rhythm without murmur, gallop or rub.    Assessment and Plan:  1. Muscle weakness of lower extremity  2. Conversion disorder  She continues to have giveaway weakness of bilateral lower extremity.  I doubt that there is any underlying organic neurological cause for her symptoms.  This is likely caused by underlying " functional disorder/conversion disorder.  Continue with exercises at home.  She is already following with Dr. Lugo at [facility].  I have advised her to call office in case if she develops any new symptoms otherwise, I will see her back in clinic as needed.       I spent 30 minutes in patient care: Reviewing records prior to the visit, entering orders and documentation and spent more than [place] 50% of this time face-to-face in management, instructions and education regarding above mentioned diagnosis and also on counseling and discussing about taking medication regularly, possible side effects with medication use, importance of good sleep hygiene, good hydration and regular exercise.    Return if symptoms worsen or fail to improve.